# Patient Record
Sex: FEMALE | Race: OTHER | NOT HISPANIC OR LATINO | URBAN - METROPOLITAN AREA
[De-identification: names, ages, dates, MRNs, and addresses within clinical notes are randomized per-mention and may not be internally consistent; named-entity substitution may affect disease eponyms.]

---

## 2017-06-29 ENCOUNTER — EMERGENCY (EMERGENCY)
Facility: HOSPITAL | Age: 22
LOS: 1 days | Discharge: HOME | End: 2017-06-29
Attending: EMERGENCY MEDICINE | Admitting: EMERGENCY MEDICINE
Payer: MEDICAID

## 2017-06-29 VITALS
HEART RATE: 139 BPM | TEMPERATURE: 100 F | DIASTOLIC BLOOD PRESSURE: 90 MMHG | OXYGEN SATURATION: 97 % | SYSTOLIC BLOOD PRESSURE: 124 MMHG | RESPIRATION RATE: 16 BRPM

## 2017-06-29 VITALS
SYSTOLIC BLOOD PRESSURE: 108 MMHG | DIASTOLIC BLOOD PRESSURE: 75 MMHG | HEART RATE: 102 BPM | OXYGEN SATURATION: 99 % | RESPIRATION RATE: 16 BRPM

## 2017-06-29 PROCEDURE — 99283 EMERGENCY DEPT VISIT LOW MDM: CPT

## 2017-06-29 RX ORDER — AMOXICILLIN 250 MG/5ML
500 SUSPENSION, RECONSTITUTED, ORAL (ML) ORAL ONCE
Qty: 0 | Refills: 0 | Status: COMPLETED | OUTPATIENT
Start: 2017-06-29 | End: 2017-06-29

## 2017-06-29 RX ORDER — IBUPROFEN 200 MG
600 TABLET ORAL ONCE
Qty: 0 | Refills: 0 | Status: COMPLETED | OUTPATIENT
Start: 2017-06-29 | End: 2017-06-29

## 2017-06-29 RX ADMIN — Medication 500 MILLIGRAM(S): at 08:51

## 2017-06-29 RX ADMIN — Medication 600 MILLIGRAM(S): at 08:51

## 2017-06-29 NOTE — ED PROVIDER NOTE - OBJECTIVE STATEMENT
Jaw pain for 5 days, worse this morning, swelling this morning, scheduled to see dentist monday, could not arrange for earlier appointment due to insurance issues. No prior episodes, no trauma, able to chew and eat until today.

## 2017-06-29 NOTE — ED ADULT NURSE NOTE - OBJECTIVE STATEMENT
22 y/o female patient presents ambulatory to ED with sister at bedside complaining of right sided facial pain/swelling x 2 days. 22 y/o female patient presents ambulatory to ED with sister at bedside complaining of right sided facial pain/swelling x 2 days. Patient states swelling and pain has progressed, but is unable to see a dentist until dental insurance becomes activated after 7/1- pt has appt 7/3. Patient presents to ED wisdom tooth starting to come in on the right lower side of gum with multiple cavities in other teeth. Patient denies SOB and CP, no N/V/D, no headache, no lightheadedness/dizziness, no weakness, no numbness or tingling, no abdominal pain or tenderness, no syncope, no cough, no URI. 20 y/o female patient presents ambulatory to ED with sister at bedside complaining of right sided facial pain/swelling x 2 days. Patient states swelling and pain has progressed, but is unable to see a dentist until dental insurance becomes activated after 7/1- pt has appt 7/3. Patient presents to ED what appears to be a wisdom tooth starting to come in on the right lower side of gum (+gum swelling) with multiple cavities in other teeth. Patient has been taking Tylenol to control pain without much relief. Patient states she has not had any difficulty with eating until this AM. Patient febrile in ED. Patient denies SOB and CP, no N/V/D, no headache, no lightheadedness/dizziness, no weakness, no numbness or tingling, no abdominal pain or tenderness, no syncope, no cough, no URI. Patient has PMHX hypothyroid.

## 2017-06-29 NOTE — ED PROVIDER NOTE - PHYSICAL EXAMINATION
(GENERAL) This is a well developed, well nourished patient who is in no apparent distress.  (HEENT) There is no signs of trauma of the head, neck, chest, or belly.  No throat pain, multiple dental caries, swelling right jaw area without fluctuence, no trismus, able to fully open TMJ with minimal pain, clear speech The outer ears appears normal. (EYES) The sclera is anicteric, conjunctiva is pink, mucous membranes are moist.  (Respiratory)There is no stridor and the patient is moving air throughout the respiratory tract well.  There are no visible masses of the neck.  (CARDIAC) The rate appears to be normal. There is no JVD. (SKIN) The skin is warm and dry. (MUSCULOSKELETAL) The extremities are warm to the touch with good capillary refill and without edema. (PSYCH) The patient is cooperative and pleasant.  (NEURO) There are no obvious focal deformities on neurologic examination.

## 2017-07-01 ENCOUNTER — EMERGENCY (EMERGENCY)
Facility: HOSPITAL | Age: 22
LOS: 1 days | Discharge: HOME | End: 2017-07-01
Attending: EMERGENCY MEDICINE | Admitting: EMERGENCY MEDICINE
Payer: MEDICAID

## 2017-07-01 VITALS
WEIGHT: 136.03 LBS | HEIGHT: 63 IN | RESPIRATION RATE: 18 BRPM | SYSTOLIC BLOOD PRESSURE: 126 MMHG | HEART RATE: 103 BPM | OXYGEN SATURATION: 100 % | TEMPERATURE: 100 F | DIASTOLIC BLOOD PRESSURE: 85 MMHG

## 2017-07-01 VITALS
OXYGEN SATURATION: 100 % | RESPIRATION RATE: 18 BRPM | HEART RATE: 75 BPM | DIASTOLIC BLOOD PRESSURE: 75 MMHG | SYSTOLIC BLOOD PRESSURE: 121 MMHG

## 2017-07-01 PROCEDURE — 99284 EMERGENCY DEPT VISIT MOD MDM: CPT

## 2017-07-01 RX ORDER — LEVOTHYROXINE SODIUM 125 MCG
0 TABLET ORAL
Qty: 0 | Refills: 0 | COMMUNITY

## 2017-07-01 RX ORDER — AMOXICILLIN 250 MG/5ML
1 SUSPENSION, RECONSTITUTED, ORAL (ML) ORAL
Qty: 0 | Refills: 0 | COMMUNITY

## 2017-07-01 NOTE — ED PROVIDER NOTE - ENMT, MLM
Airway patent, Nasal mucosa clear. Mouth with normal mucosa. Throat has no vesicles, no oropharyngeal exudates and uvula is midline. lower right mouth with sutured drain in place. right buccal swelling

## 2017-07-01 NOTE — ED PROVIDER NOTE - PROGRESS NOTE DETAILS
Fe Bryan, DO: Pt s/p drain removal by dental. No trismus on exam. +R buccal swelling. No pustular discharge s/p removal. Pt scheduled to follow up with dentist on Monday (2 days). Pt ready for d/c.

## 2017-07-01 NOTE — ED ADULT NURSE NOTE - OBJECTIVE STATEMENT
20 y/o F presents to ED for drain removal.  Pt states she was seen in the ED 2 days ago and was told she had an infection on the R side of her mouth.  Pt states she had an abscess and has it drained 2 days ago and was told to come back today to remove th drain.  Pt has no other complaints and is only here to remove drain.  Patient is A&Ox4. Face is symmetrical. PERRL 3mmB. Speech is clear. Patient is moving all extremities with 5/5 strength and walks with steady gait. Pt safety and comfort measures provided,

## 2017-07-01 NOTE — ED ADULT TRIAGE NOTE - CHIEF COMPLAINT QUOTE
Pt was told to come back today to remove the drain from the tooth, pt was seen in the ER last Thursday

## 2017-07-01 NOTE — CONSULT NOTE ADULT - SUBJECTIVE AND OBJECTIVE BOX
Patient is a 21y old  Female who presents with a chief complaint of drain that needs removal. Patient states drain was placed on Thursday (06/29/17).     HPI: Patient states swelling in LR quadrant began seven days ago. Patient reports pain (5/10) on 6/29/17 ED visit. Patient was seen by dental resident in [facility] for I&D and drain placement. Patient was prescribed Amoxicillin 500mg TID for seven days. Patient reports compliance with Amoxicillin and that swelling and pain have dramatically decreased.       PAST MEDICAL & SURGICAL HISTORY:  Hypothyroid  No significant past surgical history    ( - ) heart valve replacement  ( -  ) joint replacement  ( - ) pregnancy    MEDICATIONS  (STANDING): Levothyroxine     MEDICATIONS  (PRN): Ibuprofen 600mg       Allergies    No Known Allergies    Intolerances              *SOCIAL HISTORY: Patient presents to clinic with 22 year old sister, who was waiting in waiting room. Patient is a college student who does not drink alcohol or smoke.     *Last Dental Visit: 06/29/17     Vital Signs Last 24 Hrs  T(C): 37.1 (01 Jul 2017 12:57), Max: 37.6 (01 Jul 2017 12:55)  T(F): 98.8 (01 Jul 2017 12:57), Max: 99.7 (01 Jul 2017 12:55)  HR: 80 (01 Jul 2017 12:57) (80 - 103)  BP: 117/78 (01 Jul 2017 12:57) (117/78 - 126/85)  BP(mean): --  RR: 18 (01 Jul 2017 12:57) (18 - 18)  SpO2: 96% (01 Jul 2017 12:57) (96% - 100%)    EOE:  TMJ (  - ) clicks                    (  -  ) pops                    (   - ) crepitus             Mandible <<FROM>>             Facial bones and MOM <<grossly intact>>             ( -  ) trismus             ( - ) LAD             ( +)swelling             ( +) asymmetry             ( +) palpation             ( - ) SOB             ( -) dysphagia                IOE:  permanent dentition: multiple carious teeth            hard/soft palate: WNL  (-)palatal torus            tongue/FOM WNL           labial/buccal mucosa WNL           (+) palpation, buccal mucosa posterior LR quadrant            ( + ) swelling            ( - ) abscess           ( - ) sinus tract    Dentition present: 1-32  Caries: Multiple carious lesions           *DENTAL RADIOGRAPHS: Panoramic radiograph taken 6/29/17 at [facility]. Radiograph shows periapical radiolucency on tooth #29, previously root canal treated with temporary crown in place, multiple carious lesions, multiple periapical radiolucencies, vertically impacted third molars, fractured coronal tooth structure of tooth #13.       *ASSESSMENT: 21 year old, female presents for drain removal with healing abscess S/P I&D and drain placement two days prior.      PROCEDURE:   Verbal and written consent given.  Anesthesia: no local anesthesia given  Treatment: Removal of drain and sutures, copious irrigation with 0.9% sodium chloride, post operative instructions given to patient.     RECOMMENDATIONS:  1) Patient was advised to complete her course of Amoxicillin.  2) Dental F/U with outpatient dentist for comprehensive dental care.   3) If any difficulty swallowing/breathing, fever occur, return to ER.     Kalpana Zhang DDS and Edelmira Wright DDS, Pager # [phone]

## 2017-07-01 NOTE — ED PROVIDER NOTE - OBJECTIVE STATEMENT
patient here reuqesting drain in mouth to be pulled. states was here thursday, went to ed then dental clinic, had drain placed. reports adherence with abx. denies fever, chills. reports facial swelling improving. denies difficulty swallowing.

## 2023-04-27 NOTE — ED PROVIDER NOTE - CARDIAC, MLM
Tried to contact patient no answer uploaded excuse to my chart.    Normal rate, regular rhythm.  Heart sounds S1, S2.  No murmurs, rubs or gallops.

## 2025-05-27 PROBLEM — Z00.00 ENCOUNTER FOR PREVENTIVE HEALTH EXAMINATION: Status: ACTIVE | Noted: 2025-05-27

## 2025-05-28 ENCOUNTER — APPOINTMENT (OUTPATIENT)
Age: 30
End: 2025-05-28
Payer: COMMERCIAL

## 2025-05-28 ENCOUNTER — TRANSCRIPTION ENCOUNTER (OUTPATIENT)
Age: 30
End: 2025-05-28

## 2025-05-28 VITALS
BODY MASS INDEX: 28.88 KG/M2 | SYSTOLIC BLOOD PRESSURE: 125 MMHG | HEIGHT: 63 IN | DIASTOLIC BLOOD PRESSURE: 80 MMHG | WEIGHT: 163 LBS

## 2025-05-28 DIAGNOSIS — E28.39 OTHER PRIMARY OVARIAN FAILURE: ICD-10-CM

## 2025-05-28 DIAGNOSIS — Z82.49 FAMILY HISTORY OF ISCHEMIC HEART DISEASE AND OTHER DISEASES OF THE CIRCULATORY SYSTEM: ICD-10-CM

## 2025-05-28 DIAGNOSIS — Z78.9 OTHER SPECIFIED HEALTH STATUS: ICD-10-CM

## 2025-05-28 DIAGNOSIS — E06.3 AUTOIMMUNE THYROIDITIS: ICD-10-CM

## 2025-05-28 DIAGNOSIS — Z11.3 ENCOUNTER FOR SCREENING FOR INFECTIONS WITH A PREDOMINANTLY SEXUAL MODE OF TRANSMISSION: ICD-10-CM

## 2025-05-28 DIAGNOSIS — F12.91 CANNABIS USE, UNSPECIFIED, IN REMISSION: ICD-10-CM

## 2025-05-28 DIAGNOSIS — Z86.59 PERSONAL HISTORY OF OTHER MENTAL AND BEHAVIORAL DISORDERS: ICD-10-CM

## 2025-05-28 DIAGNOSIS — Z83.3 FAMILY HISTORY OF DIABETES MELLITUS: ICD-10-CM

## 2025-05-28 DIAGNOSIS — Z82.79 FAMILY HISTORY OF OTHER CONGENITAL MALFORMATIONS, DEFORMATIONS AND CHROMOSOMAL ABNORMALITIES: ICD-10-CM

## 2025-05-28 DIAGNOSIS — Z01.419 ENCOUNTER FOR GYNECOLOGICAL EXAMINATION (GENERAL) (ROUTINE) W/OUT ABNORMAL FINDINGS: ICD-10-CM

## 2025-05-28 DIAGNOSIS — R23.2 FLUSHING: ICD-10-CM

## 2025-05-28 DIAGNOSIS — Z12.4 ENCOUNTER FOR SCREENING FOR MALIGNANT NEOPLASM OF CERVIX: ICD-10-CM

## 2025-05-28 LAB
HCG UR QL: NEGATIVE
QUALITY CONTROL: YES

## 2025-05-28 PROCEDURE — 99459 PELVIC EXAMINATION: CPT | Mod: NC

## 2025-05-28 PROCEDURE — 99385 PREV VISIT NEW AGE 18-39: CPT

## 2025-05-28 PROCEDURE — 99204 OFFICE O/P NEW MOD 45 MIN: CPT | Mod: 25

## 2025-05-28 PROCEDURE — 81025 URINE PREGNANCY TEST: CPT

## 2025-05-28 PROCEDURE — 36415 COLL VENOUS BLD VENIPUNCTURE: CPT

## 2025-05-28 RX ORDER — ESTRADIOL 0.1 MG/D
0.1 PATCH, EXTENDED RELEASE TRANSDERMAL
Qty: 14 | Refills: 3 | Status: ACTIVE | COMMUNITY
Start: 2025-05-28 | End: 1900-01-01

## 2025-05-28 RX ORDER — VENLAFAXINE 37.5 MG/1
37.5 TABLET ORAL
Refills: 0 | Status: ACTIVE | COMMUNITY

## 2025-05-28 RX ORDER — PROGESTERONE 200 MG/1
200 CAPSULE ORAL DAILY
Qty: 12 | Refills: 4 | Status: ACTIVE | COMMUNITY
Start: 2025-05-28 | End: 1900-01-01

## 2025-05-28 RX ORDER — LEVOTHYROXINE SODIUM 50 UG/1
50 CAPSULE ORAL
Refills: 0 | Status: ACTIVE | COMMUNITY

## 2025-05-29 ENCOUNTER — TRANSCRIPTION ENCOUNTER (OUTPATIENT)
Age: 30
End: 2025-05-29

## 2025-05-29 PROBLEM — Z82.79 FAMILY HISTORY OF FRAGILE X SYNDROME: Status: ACTIVE | Noted: 2025-05-29

## 2025-05-29 LAB
C TRACH RRNA SPEC QL NAA+PROBE: NOT DETECTED
ESTRADIOL SERPL-MCNC: 8 PG/ML
FSH SERPL-MCNC: 64.7 IU/L
HIV1+2 AB SPEC QL IA.RAPID: NONREACTIVE
LH SERPL-ACNC: 40.6 IU/L
N GONORRHOEA RRNA SPEC QL NAA+PROBE: NOT DETECTED
SOURCE TP AMPLIFICATION: NORMAL
T PALLIDUM AB SER QL IA: NEGATIVE
T VAGINALIS RRNA SPEC QL NAA+PROBE: NOT DETECTED
T4 FREE SERPL-MCNC: 1.2 NG/DL
TSH SERPL-ACNC: 1.91 UIU/ML

## 2025-05-30 LAB
HBV SURFACE AG SER QL: NONREACTIVE
HCV AB SER QL: NONREACTIVE
HCV S/CO RATIO: 0.13 S/CO
TESTOST FREE SERPL-MCNC: 2.8 PG/ML
TESTOST SERPL-MCNC: 14.1 NG/DL

## 2025-06-02 LAB — CYTOLOGY CVX/VAG DOC THIN PREP: NORMAL

## 2025-07-29 ENCOUNTER — APPOINTMENT (OUTPATIENT)
Age: 30
End: 2025-07-29
Payer: COMMERCIAL

## 2025-07-29 VITALS
HEIGHT: 63 IN | WEIGHT: 163 LBS | SYSTOLIC BLOOD PRESSURE: 107 MMHG | HEART RATE: 81 BPM | DIASTOLIC BLOOD PRESSURE: 71 MMHG | BODY MASS INDEX: 28.88 KG/M2

## 2025-07-29 DIAGNOSIS — Z12.4 ENCOUNTER FOR SCREENING FOR MALIGNANT NEOPLASM OF CERVIX: ICD-10-CM

## 2025-07-29 DIAGNOSIS — E28.39 OTHER PRIMARY OVARIAN FAILURE: ICD-10-CM

## 2025-07-29 DIAGNOSIS — Z01.419 ENCOUNTER FOR GYNECOLOGICAL EXAMINATION (GENERAL) (ROUTINE) W/OUT ABNORMAL FINDINGS: ICD-10-CM

## 2025-07-29 DIAGNOSIS — Z11.3 ENCOUNTER FOR SCREENING FOR INFECTIONS WITH A PREDOMINANTLY SEXUAL MODE OF TRANSMISSION: ICD-10-CM

## 2025-07-29 DIAGNOSIS — R23.2 FLUSHING: ICD-10-CM

## 2025-07-29 PROCEDURE — 99214 OFFICE O/P EST MOD 30 MIN: CPT

## 2025-08-14 ENCOUNTER — APPOINTMENT (OUTPATIENT)
Dept: NEUROSURGERY | Facility: CLINIC | Age: 30
End: 2025-08-14

## 2025-08-22 ENCOUNTER — OFFICE (OUTPATIENT)
Dept: URBAN - METROPOLITAN AREA CLINIC 6 | Facility: CLINIC | Age: 30
Setting detail: OPHTHALMOLOGY
End: 2025-08-22
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
## 2025-08-22 PROCEDURE — NO SHOW FE NO SHOW FEE: Performed by: OPHTHALMOLOGY
